# Patient Record
Sex: MALE | Race: WHITE | URBAN - METROPOLITAN AREA
[De-identification: names, ages, dates, MRNs, and addresses within clinical notes are randomized per-mention and may not be internally consistent; named-entity substitution may affect disease eponyms.]

---

## 2017-08-15 ENCOUNTER — OFFICE VISIT (OUTPATIENT)
Dept: ORTHOPEDIC SURGERY | Age: 47
End: 2017-08-15

## 2017-08-15 VITALS — WEIGHT: 205 LBS | HEIGHT: 75 IN | BODY MASS INDEX: 25.49 KG/M2

## 2017-08-15 DIAGNOSIS — S61.239A: ICD-10-CM

## 2017-08-15 DIAGNOSIS — S62.624S OPEN DISPLACED FRACTURE OF MIDDLE PHALANX OF RIGHT RING FINGER, SEQUELA: Primary | ICD-10-CM

## 2017-08-15 PROBLEM — S62.622B OPEN DISPLACED FRACTURE OF MIDDLE PHALANX OF RIGHT MIDDLE FINGER: Status: ACTIVE | Noted: 2017-08-15

## 2017-08-15 PROCEDURE — 99203 OFFICE O/P NEW LOW 30 MIN: CPT | Performed by: ORTHOPAEDIC SURGERY

## 2017-08-22 ENCOUNTER — OFFICE VISIT (OUTPATIENT)
Dept: ORTHOPEDIC SURGERY | Age: 47
End: 2017-08-22

## 2017-08-22 VITALS — WEIGHT: 205.03 LBS | BODY MASS INDEX: 25.49 KG/M2 | HEIGHT: 75 IN

## 2017-08-22 DIAGNOSIS — S62.624B OPEN DISPLACED FRACTURE OF MIDDLE PHALANX OF RIGHT RING FINGER, INITIAL ENCOUNTER: ICD-10-CM

## 2017-08-22 DIAGNOSIS — S61.239D GUNSHOT WOUND OF FINGER OF RIGHT HAND, SUBSEQUENT ENCOUNTER: Primary | ICD-10-CM

## 2017-08-22 PROCEDURE — 99213 OFFICE O/P EST LOW 20 MIN: CPT | Performed by: ORTHOPAEDIC SURGERY

## 2017-09-08 ENCOUNTER — OFFICE VISIT (OUTPATIENT)
Dept: ORTHOPEDIC SURGERY | Age: 47
End: 2017-09-08

## 2017-09-08 VITALS — BODY MASS INDEX: 25.49 KG/M2 | HEIGHT: 75 IN | WEIGHT: 205 LBS

## 2017-09-08 DIAGNOSIS — S62.622D OPEN DISPLACED FRACTURE OF MIDDLE PHALANX OF RIGHT MIDDLE FINGER WITH ROUTINE HEALING, SUBSEQUENT ENCOUNTER: ICD-10-CM

## 2017-09-08 DIAGNOSIS — S61.239D GUNSHOT WOUND OF FINGER OF RIGHT HAND, SUBSEQUENT ENCOUNTER: Primary | ICD-10-CM

## 2017-09-08 PROCEDURE — 99213 OFFICE O/P EST LOW 20 MIN: CPT | Performed by: ORTHOPAEDIC SURGERY

## 2017-09-22 ENCOUNTER — HOSPITAL ENCOUNTER (OUTPATIENT)
Dept: OCCUPATIONAL THERAPY | Age: 47
Discharge: HOME | End: 2017-09-30
Admitting: ORTHOPAEDIC SURGERY
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
## 2017-09-26 PROBLEM — S62.624B OPEN DISPLACED FRACTURE OF MIDDLE PHALANX OF RIGHT RING FINGER: Status: ACTIVE | Noted: 2017-09-26

## 2017-09-28 ENCOUNTER — TELEPHONE (OUTPATIENT)
Dept: ORTHOPEDIC SURGERY | Age: 47
End: 2017-09-28
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
## 2017-10-02 NOTE — TELEPHONE ENCOUNTER
Eugenio Baxter was out of the office all last week and today is my first day back. [employer] has been adjusted to the best of my ability.  Please let me know if there needs to be anything further corrected

## 2017-10-02 NOTE — TELEPHONE ENCOUNTER
Katelyn GALVAN replied she was out of the office and either Kedar Lester or Colby Miller assisted Dr. Latonya Car on this Friday visit. I am reaching out to both of them and Karla (PSS w/Dr. Latonya Car) as I need this to be corrected asap the IW is not getting paid.     Venkat Beltráners  Signed  Date of Service: 2017 4:08 PM         Received call from  Trang Tabor w/[employer] from 17  17 and she noted that the medco is confusing is the IW released to full duty, working with restrictions, what is the actual status?     SECTION 3A             YES is checked that patient has restrictions                                              TEMPORARY is checked as well     SECTION 3B             NO is checked as IW is not able to return to duties held prior to injury                                              ESTIMATED return to [id number] [place] [address] date is 17     SECTION 3C            Return to work with restrictions date is 17                                              Activity boxes are all check C for continuously                                              Free type area is ok     SECTION 4A             YES or NO has not been checked next to the conditions with which on is responsible for working with restrictions                                                 Not available at the time of visit but there has been update to allowed conditions:  THIS MUST BE ON THE REVISED [address] [place] [address],[address] REQUESTED                                              D09.217T [address] OPEN WOUND W/O NAIL BED DAMAGE     Once the [employer] has been revised and I've been notified I will forward to ab

## 2017-10-06 ENCOUNTER — OFFICE VISIT (OUTPATIENT)
Dept: ORTHOPEDIC SURGERY | Age: 47
End: 2017-10-06

## 2017-10-06 ENCOUNTER — HOSPITAL ENCOUNTER (OUTPATIENT)
Dept: OCCUPATIONAL THERAPY | Age: 47
Discharge: HOME | End: 2017-10-06
Admitting: ORTHOPAEDIC SURGERY

## 2017-10-06 VITALS — WEIGHT: 205.03 LBS | BODY MASS INDEX: 25.49 KG/M2 | HEIGHT: 75 IN

## 2017-10-06 DIAGNOSIS — S62.624D: ICD-10-CM

## 2017-10-06 DIAGNOSIS — S61.239D GUNSHOT WOUND OF FINGER OF RIGHT HAND, SUBSEQUENT ENCOUNTER: ICD-10-CM

## 2017-10-06 DIAGNOSIS — M79.644 FINGER PAIN, RIGHT: Primary | ICD-10-CM

## 2017-10-06 PROCEDURE — 73140 X-RAY EXAM OF FINGER(S): CPT | Performed by: ORTHOPAEDIC SURGERY

## 2017-10-06 PROCEDURE — 99213 OFFICE O/P EST LOW 20 MIN: CPT | Performed by: ORTHOPAEDIC SURGERY

## 2017-10-06 NOTE — MR AVS SNAPSHOT
not need to use this code after youve completed the sign-up process. If you do not sign up before the expiration date, you must request a new code. [employer] Access Code: [email or web address]  Expires: 10/8/2017 11:00 PM    4. Enter your Social Security Number ([id number]) and Date of Birth (mm/dd/yyyy) as indicated and click Submit. You will be taken to the next sign-up page. 5. Create a [employer] ID. This will be your [employer] login ID and cannot be changed, so think of one that is secure and easy to remember. 6. Create a [employer] password. You can change your password at any time. 7. Enter your Password Reset Question and Answer. This can be used at a later time if you forget your password. 8. Enter your e-mail address. You will receive e-mail notification when new information is available in [address]. 9. Click Sign Up. You can now view your medical record. Additional Information  If you have questions, please contact the physician practice where you receive care. Remember, [employer] is NOT to be used for urgent needs. For medical emergencies, dial 911. For questions regarding your [employer] account call [phone]. If you have a clinical question, please call your doctor's office.
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
## 2017-10-06 NOTE — FLOWSHEET NOTE
% or less on the Quick DASH disability questionnaire for increased performance with carrying, moving, and handling objects. 3) Pt will demonstrate increased ROM to right ring finger SALEH to 220 for improved get ring finger out of the way to write efficiently. Helene Conteh 4) Pt will demonstrate increased strength to right  increase to 100 lbs. for improved ability to pull object out of a manhole with a rope. Progression Towards Functional goals:  [x] Patient is progressing as expected towards functional goals listed. [] Progression is slowed due to complexities listed. [] Progression has been slowed due to co-morbidities. [] Plan just implemented, too soon to assess goals progression  [] Other:     ASSESSMENT:    Treatment/Activity Tolerance:  [x] Patient tolerated treatment well [] Patient limited by fatique  [] Patient limited by pain  [] Patient limited by other medical complications  [] Other:     Prognosis: [x] Good [] Fair  [] Poor    Patient Requires Follow-up: [x] Yes  [] No    PLAN: Recommend Occupational Therapy continue, decrease to 1x time a week for 4 weeks  [x] Continue per plan of care [] Alter current plan (see comments)  [] Plan of care initiated [] Hold pending MD visit [] Discharge    Plan for next session: progress ROM and strengthening, scar mob, and consider extension splint if progress slows.    Thermal modalities, functional activities and strengthening as inicated, AROM, PROM as indicated    Electronically signed by: KEREN Yen/AKIN, [address] [place], [place] [id number]

## 2017-10-13 ENCOUNTER — HOSPITAL ENCOUNTER (OUTPATIENT)
Dept: OCCUPATIONAL THERAPY | Age: 47
Discharge: HOME | End: 2017-10-13
Admitting: ORTHOPAEDIC SURGERY

## 2017-10-13 NOTE — FLOWSHEET NOTE
8.2  L: 7.4    Edema in cm circumf. Wrist R:  L:     strength in lbs R: 80  L: 115 R:85  L:105   Pinch Strengthin lbs: lat  R: 21  L: 24    Pinch Strength in lbs:  3 point R: 15  L: 20          Modalities: 9/22/17   10/6/17  10/13/17     Hot Pack With alternating passive digital ext and flex 10 min Para + HP             Therapeutic Exercise & Activities:        PROM PIP extension  Intrinsic minus flexion composite, PIP ext      AROM Intrinsic minus flexion and composite fisting, isolated digital extension off table       education To above exercises, use of heat, and use of compression for edema control       Power   #4 30x        Re-eval, review HEP      putty   Rolling/kneading/pinching     digiflex   green       xxlg compression sleeve XLG     Sponge  with tongs   X     Elastic putty   pulls       Therapeutic Exercise and NMR EXR  [x] (53759) Provided verbal/tactile cueing for activities related to strengthening, flexibility, endurance, ROM  for improvements in scapular, scapulothoracic and UE control with self care, reaching, carrying, lifting, house/yardwork, driving/computer work.    [] (00798) Provided verbal/tactile cueing for activities related to improving balance, coordination, kinesthetic sense, posture, motor skill, proprioception  to assist with  scapular, scapulothoracic and UE control with self care, reaching, carrying, lifting, house/yardwork, driving/computer work. Therapeutic Activities:    [] ( 47469) therapeutic activities, direct (one on one) patient contact. Use of dynamic activities to improve functional performance.     Activities of Daily Living:  [] (72295) Provided self-care/home management training (i.e., activities of daily living and compensatory training, meal preparation, safety procedures, and instructions in use of assistive technology devices/adaptive equipment)     Home Exercise Program:    [x] (51981) Reviewed/Progressed HEP activities related to strengthening, on the Quick DASH disability questionnaire for increased performance with carrying, moving, and handling objects. 3) Pt will demonstrate increased ROM to right ring finger SALEH to 220 for improved get ring finger out of the way to write efficiently. Santi Khan 4) Pt will demonstrate increased strength to right  increase to 100 lbs. for improved ability to pull object out of a manhole with a rope. Progression Towards Functional goals:  [x] Patient is progressing as expected towards functional goals listed. [] Progression is slowed due to complexities listed. [] Progression has been slowed due to co-morbidities. [] Plan just implemented, too soon to assess goals progression  [] Other:     ASSESSMENT:    Treatment/Activity Tolerance:  [x] Patient tolerated treatment well [] Patient limited by fatique  [] Patient limited by pain  [] Patient limited by other medical complications  [] Other:     Prognosis: [x] Good [] Fair  [] Poor    Patient Requires Follow-up: [x] Yes  [] No    PLAN: Recommend Occupational Therapy continue, decrease to 1x time a week for 4 weeks  [x] Continue per plan of care [] Alter current plan (see comments)  [] Plan of care initiated [] Hold pending MD visit [] Discharge    Plan for next session: progress ROM and strengthening, scar mob, and consider extension splint if progress slows.    Thermal modalities, functional activities and strengthening as inicated, AROM, PROM as indicated    Electronically signed by: Odessa Telles OT/L [id number]

## 2017-10-20 ENCOUNTER — HOSPITAL ENCOUNTER (OUTPATIENT)
Dept: OCCUPATIONAL THERAPY | Age: 47
Discharge: HOME | End: 2017-10-20
Admitting: ORTHOPAEDIC SURGERY
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
## 2017-10-27 ENCOUNTER — HOSPITAL ENCOUNTER (OUTPATIENT)
Dept: OCCUPATIONAL THERAPY | Age: 47
Discharge: HOME | End: 2017-10-27
Admitting: ORTHOPAEDIC SURGERY

## 2017-10-27 NOTE — FLOWSHEET NOTE
Edema in cm circumf. MCPJs R: 8.2  L: 7.4     Edema in cm circumf. Wrist R:  L:      strength in lbs R: 80  L: 115 R:85  L:105 R: 120  L: 120   Pinch Strengthin lbs: lat  R: 21  L: 24     Pinch Strength in lbs:  3 point R: 15  L: 20           Modalities: 9/22/17   10/6/17  10/13/17 10/20/17  10/27/17   Hot Pack With alternating passive digital ext and flex 10 min Para + HP same same        US 50% at 1.4 at scar areas   Therapeutic Exercise & Activities:        PROM PIP extension  Intrinsic minus flexion composite, PIP ext  Intrinsic stretch added as he was having trouble adducting the small finger    AROM Intrinsic minus flexion and composite fisting, isolated digital extension off table       education To above exercises, use of heat, and use of compression for edema control       Power   #4 30x  #4      Re-eval, review HEP      putty   Rolling/kneading/pinching Blue-pull, mallet,     digiflex   green   blue                xxlg compression sleeve XLG     Sponge  with tongs   X     Power  with sponge      X   digi flex     Green and blue x 25   Body blade     Hold with finger tips   Flex bar    blue    Elastic putty   pulls  pulls     Therapeutic Exercise and NMR EXR  [x] (39083) Provided verbal/tactile cueing for activities related to strengthening, flexibility, endurance, ROM  for improvements in scapular, scapulothoracic and UE control with self care, reaching, carrying, lifting, house/yardwork, driving/computer work.    [] (44196) Provided verbal/tactile cueing for activities related to improving balance, coordination, kinesthetic sense, posture, motor skill, proprioception  to assist with  scapular, scapulothoracic and UE control with self care, reaching, carrying, lifting, house/yardwork, driving/computer work. Therapeutic Activities:    [] ( 71261) therapeutic activities, direct (one on one) patient contact.  Use of dynamic activities to improve functional 2. Patient will have a decrease in pain to facilitate improvement in movement, function, and ADLs as indicated by Functional Deficits.-goal met     Long Term Goals to be achieved in 6  weeks, including patient directed goals to address identified performance deficits:  1) Pt to be independent in graded HEP progression with a good level of effort and compliance. 2) Pt to report a score of  20 % or less on the Quick DASH disability questionnaire for increased performance with carrying, moving, and handling objects. 3) Pt will demonstrate increased ROM to right ring finger SALEH to 220 for improved get ring finger out of the way to write efficiently. Vernida Chard 4) Pt will demonstrate increased strength to right  increase to 100 lbs. for improved ability to pull object out of a manhole with a rope. Progression Towards Functional goals:  [x] Patient is progressing as expected towards functional goals listed. [] Progression is slowed due to complexities listed. [] Progression has been slowed due to co-morbidities. [] Plan just implemented, too soon to assess goals progression  [] Other:     ASSESSMENT:  Better strength, still complains of stiffness, edema remains  Treatment/Activity Tolerance:  [x] Patient tolerated treatment well [] Patient limited by fatique  [] Patient limited by pain  [] Patient limited by other medical complications  [] Other:     Prognosis: [x] Good [] Fair  [] Poor    Patient Requires Follow-up: [x] Yes  [] No    PLAN: Recommend Occupational Therapy continue, decrease to 1x time a week for 4 weeks  [x] Continue per plan of care [] Alter current plan (see comments)  [] Plan of care initiated [] Hold pending MD visit [] Discharge    Plan for next session: progress ROM and strengthening, scar mob, and consider extension splint if progress slows.    Thermal modalities, functional activities and strengthening as inicated, AROM, PROM as indicated    Electronically signed by: Rocky Houston OT/L

## 2017-11-01 ENCOUNTER — HOSPITAL ENCOUNTER (OUTPATIENT)
Dept: PHYSICAL THERAPY | Age: 47
Discharge: HOME | End: 2017-11-30
Attending: ORTHOPAEDIC SURGERY | Admitting: ORTHOPAEDIC SURGERY

## 2017-11-03 ENCOUNTER — HOSPITAL ENCOUNTER (OUTPATIENT)
Dept: OCCUPATIONAL THERAPY | Age: 47
Discharge: HOME | End: 2017-11-03
Admitting: ORTHOPAEDIC SURGERY

## 2017-11-03 ENCOUNTER — OFFICE VISIT (OUTPATIENT)
Dept: ORTHOPEDIC SURGERY | Age: 47
End: 2017-11-03

## 2017-11-03 VITALS — WEIGHT: 205.03 LBS | BODY MASS INDEX: 25.49 KG/M2 | HEIGHT: 75 IN

## 2017-11-03 DIAGNOSIS — S62.622S: Primary | ICD-10-CM

## 2017-11-03 DIAGNOSIS — S61.202S: ICD-10-CM

## 2017-11-03 PROBLEM — S61.202A OPEN WOUND OF RIGHT MIDDLE FINGER WITHOUT DAMAGE TO NAIL: Status: ACTIVE | Noted: 2017-08-15

## 2017-11-03 PROBLEM — S62.622A CLOSED DISPLACED FRACTURE OF MIDDLE PHALANX OF RIGHT MIDDLE FINGER: Status: ACTIVE | Noted: 2017-11-03

## 2017-11-03 PROCEDURE — 99213 OFFICE O/P EST LOW 20 MIN: CPT | Performed by: ORTHOPAEDIC SURGERY
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
## 2017-11-03 NOTE — OP NOTE
circumf. Wrist R:  L:        strength in lbs R: 80  L: 115 R:85  L:105 R: 90  L:100   Pinch Strengthin lbs: lat  R: 21  L: 24       Pinch Strength in lbs:  3 point R: 15  L: 20          GOALS   Short Term Goals: To be achieved in: 2 weeks  1. Independent in HEP and progression per patient tolerance, in order to prevent re-injury.-goal met   2. Patient will have a decrease in pain to facilitate improvement in movement, function, and ADLs as indicated by Functional Deficits.-goal met     Long Term Goals to be achieved in 6  weeks, including patient directed goals to address identified performance deficits:  1) Pt to be independent in graded HEP progression with a good level of effort and compliance. -met  2) Pt to report a score of  20 % or less on the Quick DASH disability questionnaire for increased performance with carrying, moving, and handling objects.-met  3) Pt will demonstrate increased ROM to right ring finger SALEH to 220 for improved get ring finger out of the way to write efficiently. 4) Pt will demonstrate increased strength to right  increase to 100 lbs. for improved ability to pull object out of a manhole with a rope. -not met    ASSESSMENT:  Better strength, still complains of stiffness, edema remains- strength goal not met    PLAN:           Recommend continuing Occupational Therapy, 1x/week for 4 weeks with focus on ROM and functional strengtheng  [x] Continue per plan of care

## 2017-11-03 NOTE — FLOWSHEET NOTE
Ring MP: 75  PIP: 18/73  DIP 0/53 0/90  8/85  0/65  0/85  8/85  0/70                              Small MP:  PIP:  DIP      Edema in cm circumf. MCPJs R: 8.2  L: 7.4      Edema in cm circumf.   Wrist R:  L:       strength in lbs R: 80  L: 115 R:85  L:105 R: 120  L: 120 R: 90  L:100   Pinch Strengthin lbs: lat  R: 21  L: 24      Pinch Strength in lbs:  3 point R: 15  L: 20            Modalities: 9/22/17   10/6/17  10/13/17 10/20/17  10/27/17 11/3/17    Hot Pack With alternating passive digital ext and flex 10 min Para + HP same same same        US 50% at 1.4 at scar areas    Therapeutic Exercise & Activities:         PROM PIP extension  Intrinsic minus flexion composite, PIP ext  Intrinsic stretch added as he was having trouble adducting the small finger  Prom digit, scar massage   AROM Intrinsic minus flexion and composite fisting, isolated digital extension off table     Re-eval   education To above exercises, use of heat, and use of compression for edema control        Power   #4 30x  #4       Re-eval, review HEP       putty   Rolling/kneading/pinching Blue-pull, mallet,      digiflex   green   blue                 xxlg compression sleeve XLG      Sponge  with tongs   X      Power  with sponge      X    digi flex     Green and blue x 25    Body blade     Hold with finger tips    Flex bar    blue     Elastic putty   pulls  pulls      Therapeutic Exercise and NMR EXR  [x] (77393) Provided verbal/tactile cueing for activities related to strengthening, flexibility, endurance, ROM  for improvements in scapular, scapulothoracic and UE control with self care, reaching, carrying, lifting, house/yardwork, driving/computer work.    [] (02090) Provided verbal/tactile cueing for activities related to improving balance, coordination, kinesthetic sense, posture, motor skill, proprioception  to assist with  scapular, scapulothoracic and UE control with self care, reaching,

## 2017-12-01 ENCOUNTER — HOSPITAL ENCOUNTER (OUTPATIENT)
Dept: PHYSICAL THERAPY | Age: 47
Discharge: HOME | End: 2017-12-31
Attending: ORTHOPAEDIC SURGERY | Admitting: ORTHOPAEDIC SURGERY

## 2019-05-06 ENCOUNTER — OFFICE VISIT (OUTPATIENT)
Dept: ORTHOPEDIC SURGERY | Age: 49
End: 2019-05-06
Payer: COMMERCIAL

## 2019-05-06 VITALS — WEIGHT: 223 LBS | HEIGHT: 75 IN | BODY MASS INDEX: 27.73 KG/M2

## 2019-05-06 DIAGNOSIS — G56.03 BILATERAL CARPAL TUNNEL SYNDROME: ICD-10-CM

## 2019-05-06 DIAGNOSIS — M75.81 TENDONITIS OF BOTH ROTATOR CUFFS: ICD-10-CM

## 2019-05-06 DIAGNOSIS — M75.82 TENDONITIS OF BOTH ROTATOR CUFFS: ICD-10-CM

## 2019-05-06 DIAGNOSIS — R52 PAIN: Primary | ICD-10-CM

## 2019-05-06 PROCEDURE — 99214 OFFICE O/P EST MOD 30 MIN: CPT | Performed by: ORTHOPAEDIC SURGERY

## 2019-05-06 RX ORDER — DICLOFENAC SODIUM 75 MG/1
75 TABLET, DELAYED RELEASE ORAL 2 TIMES DAILY WITH MEALS
Qty: 60 TABLET | Refills: 1 | Status: SHIPPED | OUTPATIENT
Start: 2019-05-06
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
## 2019-05-07 ENCOUNTER — HOSPITAL ENCOUNTER (OUTPATIENT)
Dept: PHYSICAL THERAPY | Age: 49
Setting detail: THERAPIES SERIES
Discharge: HOME | End: 2019-05-07
Payer: COMMERCIAL

## 2019-05-07 PROCEDURE — 97161 PT EVAL LOW COMPLEX 20 MIN: CPT

## 2019-05-07 PROCEDURE — 97110 THERAPEUTIC EXERCISES: CPT

## 2019-05-07 PROCEDURE — G0283 ELEC STIM OTHER THAN WOUND: HCPCS

## 2019-05-07 PROCEDURE — 97112 NEUROMUSCULAR REEDUCATION: CPT

## 2019-05-07 NOTE — FLOWSHEET NOTE
[facility], [address] [place]  Phone: [phone]  Fax [phone]      Physical Therapy Daily Treatment Note  Date:  2019    Patient Name:  Jose D Saeed    :  1970  MRN: [id number]  Restrictions/Precautions:    Medical/Treatment Diagnosis Information:  · Diagnosis: M75.81, M75.82 Bilateral Shouldeer RTC Tendinitis  · Treatment Diagnosis: M25.511, M25.512 Bilateral SHLD Pain;  M25.611, M25.612 Bilateral SHLD Stiffness,  M25.311, M25.312 Bilateral Scapular Instability, M62.81 Bilateral Scapular and RTC Weakness  Insurance/Certification information:  PT Insurance Information: Gr[employer] (60 PT, 80/20%, Needs Auth)  Physician Information:  Referring Practitioner: Dr. Alexandro Delaney of care signed (Y/N): due 2019    Date of Patient follow up with Physician:     G-Code (if applicable):      Date G-Code Applied:  Quick Dash: 32% 2019       Progress Note: [x]  Yes  []  No  Next due by: Visit #10      Latex Allergy:  [x]NO      []YES  Preferred Language for Healthcare:   [x]English       []other:    Visit # Insurance Allowable Requires auth   1 Check Orthonet  60  2xwk for 6 wks    []no        [x]yes: Orthonet     Pain level:  5 /10  Left SHLD,  5/10 Right SHLD     SUBJECTIVE:  See eval    OBJECTIVE: See eval  Observation:   Test measurements:      RESTRICTIONS/PRECAUTIONS: Emg has been ordered to rule out carpal tunnel syndrom both hands.     Exercises/Interventions:   Therapeutic Ex Sets/rep comments   Scap Squeeze H10x10 hep   No Money RTB H2 2x10 hep   Seated Scap Retrac/Depression BTB H2 2x10 hep   Doorway Pec Minor Stretch H15x3 Bilat hep                                                                              Manual Intervention     Begin NV                                   NMR re-education     DX Review, SHLD Biomechanics Education, SHLD Care Techniques using SHLD model and anatomy illustrations 15 min hep        Posture Correction @ wall and in sitting with CX DG H5 x10 each  W/ mirror hep                                Therapeutic Exercise and NMR EXR  [] (08785) Provided verbal/tactile cueing for activities related to strengthening, flexibility, endurance, ROM  for improvements in scapular, scapulothoracic and UE control with self care, reaching, carrying, lifting, house/yardwork, driving/computer work.    [] (40761) Provided verbal/tactile cueing for activities related to improving balance, coordination, kinesthetic sense, posture, motor skill, proprioception  to assist with  scapular, scapulothoracic and UE control with self care, reaching, carrying, lifting, house/yardwork, driving/computer work. Therapeutic Activities:    [x] (03432 or 28729) Provided verbal/tactile cueing for activities related to improving balance, coordination, kinesthetic sense, posture, motor skill, proprioception and motor activation to allow for proper function of scapular, scapulothoracic and UE control with self care, carrying, lifting, driving/computer work.      Home Exercise Program:    [x] (24662) Reviewed/Progressed HEP activities related to strengthening, flexibility, endurance, ROM of scapular, scapulothoracic and UE control with self care, reaching, carrying, lifting, house/yardwork, driving/computer work  [] (30381) Reviewed/Progressed HEP activities related to improving balance, coordination, kinesthetic sense, posture, motor skill, proprioception of scapular, scapulothoracic and UE control with self care, reaching, carrying, lifting, house/yardwork, driving/computer work      Manual Treatments:  PROM / STM / Oscillations-Mobs:  G-I, II, III, IV (PA's, Inf., Post.)  [] (76416) Provided manual therapy to mobilize soft tissue/joints of cervical/CT, scapular GHJ and UE for the purpose of modulating pain, promoting relaxation,  increasing ROM, reducing/eliminating soft tissue swelling/inflammation/restriction, improving soft tissue extensibility and allowing for proper ROM for normal function with self care, reaching, carrying, lifting, house/yardwork, driving/computer work    Modalities:  PM ES/CP both shoulders (supraspinatus)x15 min    Charges:  Timed Code Treatment Minutes: 35   Total Treatment Minutes: 70     [x] EVAL (LOW) 33273 (typically 20 minutes face-to-face)  [] EVAL (MOD) 34415 (typically 30 minutes face-to-face)  [] EVAL (HIGH) 79763 (typically 45 minutes face-to-face)  [] RE-EVAL     [x] DV(50785) x      [] IONTO  [x] NMR (61357) x      [] VASO  [] Manual (26994) x       [] Other:  [] TA x       [] Mech Traction (81898)  [] ES(attended) (00243)      [x] ES (un) (11201):     GOALS:  Short Term Goals: To be achieved in: 2 weeks  1. Independent in HEP and progression per patient tolerance, in order to prevent re-injury. 2. Patient will have a decrease in pain to facilitate improvement in movement, function, and ADLs as indicated by Functional Deficits.     Long Term Goals: To be achieved in: 6 weeks  1. Disability index score of 18% or less for the [facility] to assist with reaching prior level of function. 2. Patient will demonstrate increased AROM bilat shoulder flex to 170 degrees and Int rotation to T9 to allow for proper joint functioning as indicated by patients Functional Deficits. 3. Patient will demonstrate an increase in Strength to 5/5 to allow for proper functional mobility as indicated by patients Functional Deficits. 4. Patient will be able to sleep on his sides w/o increase in symptoms or restrictions. 5.Return to UE weight lifting w/o increase in symptoms or restrictions. Progression Towards Functional goals:  [] Patient is progressing as expected towards functional goals listed. [] Progression is slowed due to complexities listed. [] Progression has been slowed due to co-morbidities.   [x] Plan just implemented, too soon to assess goals progression  [] Other: ASSESSMENT:  See eval    Treatment/Activity Tolerance:  [x] Patient tolerated treatment well [] Patient limited by fatique  [] Patient limited by pain  [] Patient limited by other medical complications  [] Other:     Prognosis: [x] Good [] Fair  [] Poor    Patient Requires Follow-up: [x] Yes  [] No    PLAN: See eval  [] Continue per plan of care [] Alter current plan (see comments)  [x] Plan of care initiated [] Hold pending MD visit [] Discharge    Electronically signed by: Arnoldo Rios, [address]

## 2019-05-07 NOTE — PLAN OF CARE
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 [] Falls Risk assessed and Patient requires intervention due to being higher risk   TUG score (>12s at risk):     [] Falls education provided, including       G-Codes:   Quick Dash = 32%    ASSESSMENT:   Functional Impairments   [x]Noted spinal or UE joint hypomobility   []Noted spinal or UE joint hypermobility   [x]Decreased UE functional ROM   [x]Decreased UE functional strength   []Abnormal reflexes/sensation/myotomal/dermatomal deficits   [x]Decreased RC/scapular/core strength and neuromuscular control   []other:      Functional Activity Limitations (from functional questionnaire and intake)   [x]Reduced ability to tolerate prolonged functional positions   [x]Reduced ability or difficulty with changes of positions or transfers between positions   [x]Reduced ability to maintain good posture and demonstrate good body mechanics with sitting, bending, and lifting   [x] Reduced ability or tolerance with driving and/or computer work   [x]Reduced ability to sleep   [x]Reduced ability to perform lifting, reaching, carrying tasks   [x]Reduced ability to tolerate impact through UE   [x]Reduced ability to reach behind back   []Reduced ability to  or hold objects   [x]Reduced ability to throw or toss an object   []other:    Participation Restrictions   []Reduced participation in self care activities   [x]Reduced participation in home management activities   [x]Reduced participation in work activities   []Reduced participation in social activities. [x]Reduced participation in sport/recreation activities. Classification:   []Signs/symptoms consistent with post-surgical status including decreased ROM, strength and function.   []Signs/symptoms consistent with joint sprain/strain   [x]Signs/symptoms consistent with shoulder impingement   [x]Signs/symptoms consistent with shoulder/elbow/wrist tendinopathy   []Signs/symptoms consistent with Rotator cuff tear   []Signs/symptoms consistent with labral tear   []Signs/symptoms consistent with postural dysfunction    []Signs/symptoms consistent with Glenohumeral IR Deficit - <45 degrees   []Signs/symptoms consistent with facet dysfunction of cervical/thoracic spine    []Signs/symptoms consistent with pathology which may benefit from Dry needling     []other:     Prognosis/Rehab Potential:      []Excellent   [x]Good    []Fair   []Poor    Tolerance of evaluation/treatment:    []Excellent   [x]Good    []Fair   []Poor  Physical Therapy Evaluation Complexity Justification  [x] A history of present problem with:  [] no personal factors and/or comorbidities that impact the plan of care;  [x]1-2 personal factors and/or comorbidities that impact the plan of care  []3 personal factors and/or comorbidities that impact the plan of care  [x] An examination of body systems using standardized tests and measures addressing any of the following: body structures and functions (impairments), activity limitations, and/or participation restrictions;:  [x] a total of 1-2 or more elements   [] a total of 3 or more elements   [] a total of 4 or more elements   [x] A clinical presentation with:  [x] stable and/or uncomplicated characteristics   [] evolving clinical presentation with changing characteristics  [] unstable and unpredictable characteristics;   [x] Clinical decision making of [x] low, [] moderate, [] high complexity using standardized patient assessment instrument and/or measurable assessment of functional outcome.     [x] EVAL (LOW) 60873 (typically 20 minutes face-to-face)  [] EVAL (MOD) 27976 (typically 30 minutes face-to-face)  [] EVAL (HIGH) 84476 (typically 45 minutes face-to-face)  [] RE-EVAL       PLAN:  Frequency/Duration:  2 days per week for 6 Weeks:  INTERVENTIONS:  [x] Therapeutic exercise including: strength training, ROM, for Upper extremity and core   [x]  NMR activation and proprioception for UE, scap and Core   [x] Manual therapy as indicated for shoulder, scapula and spine to include: Dry Needling/IASTM, STM, PROM, Gr I-IV mobilizations, manipulation. [x] Modalities as needed that may include: thermal agents, E-stim, Biofeedback, US, iontophoresis as indicated  [x] Patient education on joint protection, postural re-education, activity modification, progression of HEP. HEP instruction: Pt given written HEP and TB.(see scanned forms)    GOALS:  Patient stated goal: Return to UE weight lifting w/o increase in symptoms or restrictions. Therapist goals for Patient:   Short Term Goals: To be achieved in: 2 weeks  1. Independent in HEP and progression per patient tolerance, in order to prevent re-injury. 2. Patient will have a decrease in pain to facilitate improvement in movement, function, and ADLs as indicated by Functional Deficits. Long Term Goals: To be achieved in: 6 weeks  1. Disability index score of 18% or less for the [facility] to assist with reaching prior level of function. 2. Patient will demonstrate increased AROM bilat shoulder flex to 170 degrees and Int rotation to T9 to allow for proper joint functioning as indicated by patients Functional Deficits. 3. Patient will demonstrate an increase in Strength to 5/5 to allow for proper functional mobility as indicated by patients Functional Deficits. 4. Patient will be able to sleep on his sides w/o increase in symptoms or restrictions. 5.Return to UE weight lifting w/o increase in symptoms or restrictions.           Electronically signed by:  Yessi Garcai, [address]

## 2019-05-09 ENCOUNTER — HOSPITAL ENCOUNTER (OUTPATIENT)
Dept: PHYSICAL THERAPY | Age: 49
Setting detail: THERAPIES SERIES
Discharge: HOME | End: 2019-05-09
Payer: COMMERCIAL

## 2019-05-09 PROCEDURE — 97110 THERAPEUTIC EXERCISES: CPT | Performed by: PHYSICAL THERAPIST

## 2019-05-09 PROCEDURE — G0283 ELEC STIM OTHER THAN WOUND: HCPCS | Performed by: PHYSICAL THERAPIST

## 2019-05-09 PROCEDURE — 97140 MANUAL THERAPY 1/> REGIONS: CPT | Performed by: PHYSICAL THERAPIST

## 2019-05-09 NOTE — FLOWSHEET NOTE
[facility], [address] [place]  Phone: [phone]  Fax [phone]      Physical Therapy Daily Treatment Note  Date:  2019    Patient Name:  Carlie Granger    :  1970  MRN: [id number]  Restrictions/Precautions:    Medical/Treatment Diagnosis Information:  · Diagnosis: M75.81, M75.82 Bilateral Shouldeer RTC Tendinitis  · Treatment Diagnosis: M25.511, M25.512 Bilateral SHLD Pain;  M25.611, M25.612 Bilateral SHLD Stiffness,  M25.311, M25.312 Bilateral Scapular Instability, M62.81 Bilateral Scapular and RTC Weakness  Insurance/Certification information:  PT Insurance Information: Gr[employer]iela (60 PT, 80/20%, Needs Auth)  Physician Information:  Referring Practitioner: Dr. Thomas Sensor of care signed (Y/N): due 2019    Date of Patient follow up with Physician:     G-Code (if applicable):      Date G-Code Applied:  Quick Dash: 32% 2019       Progress Note: []  Yes  [x]  No  Next due by: Visit #10      Latex Allergy:  [x]NO      []YES  Preferred Language for Healthcare:   [x]English       []other:    Visit # Insurance Allowable Requires auth   2 Check Orthonet  60  2xwk for 6 wks    []no        [x]yes: Orthonet     Pain level:   0-4/10  Left SHLD,  0-4/10 Right SHLD     SUBJECTIVE:  Pt reports his shoulders are feeling better. He is trying to sleep on his back. He has been compliant with the HEP and paying attn to posture     OBJECTIVE:   Observation:   Test measurements:      RESTRICTIONS/PRECAUTIONS: Emg has been ordered to rule out carpal tunnel syndrom both hands.     Exercises/Interventions:   Therapeutic Ex Sets/rep comments   Scap Squeeze H10x10 hep   No Money RTB H2 2x10 hep   Seated Scap Retrac/Depression BTB H2 2x10 hep   Doorway Pec Minor Stretch H15x3 Bilat hep   Arm bike  4 min warm up     [place]ley flex  2x10     See ATC sheet   Started 19    TB IR/ER  BTB 2x10 ea  + hep 19    Supine SP  5# 3x10     Prone MT row  2# 2x10     Prone lats  4# 2x10                                             Manual Intervention     B shoulder PROM with [address] mobs  15 min                                   NMR re-education     DX Review, SHLD Biomechanics Education, SHLD Care Techniques using SHLD model and anatomy illustrations 15 min hep        Posture Correction @ wall and in sitting with CX DG H5 x10 each  W/ mirror hep                                Therapeutic Exercise and NMR EXR  [] (06849) Provided verbal/tactile cueing for activities related to strengthening, flexibility, endurance, ROM  for improvements in scapular, scapulothoracic and UE control with self care, reaching, carrying, lifting, house/yardwork, driving/computer work.    [] (12893) Provided verbal/tactile cueing for activities related to improving balance, coordination, kinesthetic sense, posture, motor skill, proprioception  to assist with  scapular, scapulothoracic and UE control with self care, reaching, carrying, lifting, house/yardwork, driving/computer work. Therapeutic Activities:    [x] (25922 or 99491) Provided verbal/tactile cueing for activities related to improving balance, coordination, kinesthetic sense, posture, motor skill, proprioception and motor activation to allow for proper function of scapular, scapulothoracic and UE control with self care, carrying, lifting, driving/computer work.      Home Exercise Program:    [x] (38964) Reviewed/Progressed HEP activities related to strengthening, flexibility, endurance, ROM of scapular, scapulothoracic and UE control with self care, reaching, carrying, lifting, house/yardwork, driving/computer work  [] (09364) Reviewed/Progressed HEP activities related to improving balance, coordination, kinesthetic sense, posture, motor skill, proprioception of scapular, scapulothoracic and UE control with self care, reaching, carrying, lifting, house/yardwork, driving/computer work      Manual Treatments: PROM / STM / Oscillations-Mobs:  G-I, II, III, IV (PA's, Inf., Post.)  [] (60539) Provided manual therapy to mobilize soft tissue/joints of cervical/CT, scapular GHJ and UE for the purpose of modulating pain, promoting relaxation,  increasing ROM, reducing/eliminating soft tissue swelling/inflammation/restriction, improving soft tissue extensibility and allowing for proper ROM for normal function with self care, reaching, carrying, lifting, house/yardwork, driving/computer work    Modalities:  PM ES/CP both shoulders (supraspinatus)x15 min    Charges:  Timed Code Treatment Minutes: 40   Total Treatment Minutes: 55     [] EVAL (LOW) 41562 (typically 20 minutes face-to-face)  [] EVAL (MOD) 62949 (typically 30 minutes face-to-face)  [] EVAL (HIGH) 36419 (typically 45 minutes face-to-face)  [] RE-EVAL     [x] EC(58765) x  2   [] IONTO  [] NMR (54085) x      [] VASO  [x] Manual (47784) x  1    [] Other:  [] TA x       [] Mech Traction (52641)  [] ES(attended) (44695)      [x] ES (un) (76894):     GOALS:  Short Term Goals: To be achieved in: 2 weeks  1. Independent in HEP and progression per patient tolerance, in order to prevent re-injury. 2. Patient will have a decrease in pain to facilitate improvement in movement, function, and ADLs as indicated by Functional Deficits.     Long Term Goals: To be achieved in: 6 weeks  1. Disability index score of 18% or less for the [facility] to assist with reaching prior level of function. 2. Patient will demonstrate increased AROM bilat shoulder flex to 170 degrees and Int rotation to T9 to allow for proper joint functioning as indicated by patients Functional Deficits. 3. Patient will demonstrate an increase in Strength to 5/5 to allow for proper functional mobility as indicated by patients Functional Deficits. 4. Patient will be able to sleep on his sides w/o increase in symptoms or restrictions.           5.Return to UE weight lifting w/o increase in symptoms or

## 2019-05-13 ENCOUNTER — HOSPITAL ENCOUNTER (OUTPATIENT)
Dept: PHYSICAL THERAPY | Age: 49
Setting detail: THERAPIES SERIES
Discharge: HOME | End: 2019-05-13
Payer: COMMERCIAL

## 2019-05-13 PROCEDURE — 97110 THERAPEUTIC EXERCISES: CPT

## 2019-05-13 PROCEDURE — G0283 ELEC STIM OTHER THAN WOUND: HCPCS

## 2019-05-13 PROCEDURE — 97140 MANUAL THERAPY 1/> REGIONS: CPT

## 2019-05-13 NOTE — FLOWSHEET NOTE
[facility] [facility], [address] [place]  Phone: [phone]  Fax [phone]      Physical Therapy Daily Treatment Note  Date:  2019    Patient Name:  Cesar Arriola    :  1970  MRN: [id number]  Restrictions/Precautions:    Medical/Treatment Diagnosis Information:  · Diagnosis: M75.81, M75.82 Bilateral Shouldeer RTC Tendinitis  · Treatment Diagnosis: M25.511, M25.512 Bilateral SHLD Pain;  M25.611, M25.612 Bilateral SHLD Stiffness,  M25.311, M25.312 Bilateral Scapular Instability, M62.81 Bilateral Scapular and RTC Weakness  Insurance/Certification information:  PT Insurance Information: Gr[employer]iel[employer] (60 PT, 80/20%, Needs Auth)  Physician Information:  Referring Practitioner: Dr. Elisa Hernandez of care signed (Y/N): due 2019    Date of Patient follow up with Physician:     G-Code (if applicable):      Date G-Code Applied:  Quick Dash: 32% 2019       Progress Note: []  Yes  [x]  No  Next due by: Visit #10      Latex Allergy:  [x]NO      []YES  Preferred Language for Healthcare:   [x]English       []other:    Visit # Insurance Allowable Requires auth   3 Check Orthonet  60  2xwk for 6 wks    []no        [x]yes: Orthonet     Pain level:   0-2/10  Left SHLD,  0-2/10 Right SHLD   SUBJECTIVE: Both shoulders feeling better w/ meds and therapy. Able to work out in the field today with minimal discomfort mostly @ end ranges of motion. Fatigued after therapy; didn't realize how weak he had gotten. OBJECTIVE:   Observation: Very tight posterior capsula and thoracic spine. Added Thread-the Needle and Sleeper Stretches to HEP. Pt given new handout after good technique demonstrated (scanned). Test measurements: Increase bilat AROM into shoulder flex to 165 deg bilat. RESTRICTIONS/PRECAUTIONS: Emg has been ordered to rule out carpal tunnel syndrom both hands.     Exercises/Interventions:   Therapeutic Ex Sets/rep comments   No Money RTB H2 2x10 hep   Seated Scap Retrac/Depression BTB H2 2x10 hep   Doorway Pec Minor Stretch H15x3 Bilat hep   Arm bike  5 min warm up     Pulley flex  2x10     SB Corner Press-ups 2x10    Pulley IR H5 x10 B    All 4's Scap Matrix OTB 3x5  2 sets bilat    90/90 Wall Stretch H15x3B    TB IR/ER  BTB 2x10 ea  + hep 5/9/19    Supine SP  5# 3x10     SB Prone  row  3# 2x10     SB Prone T 1# 2x10    SB Prone lats  4# 2x10     SL ER 3# 2x10 B    Thread the Needle H5x5 B +hep 5-   Sleeper stretch H15x3 +hep 5-             ATC See Note Start 5-9-19             Manual Intervention     B shoulder PROM with GH mobs  15 min                    NMR re-education                                  Therapeutic Exercise and NMR EXR  [] [phone]) Provided verbal/tactile cueing for activities related to strengthening, flexibility, endurance, ROM  for improvements in scapular, scapulothoracic and UE control with self care, reaching, carrying, lifting, house/yardwork, driving/computer work.    [] (29675) Provided verbal/tactile cueing for activities related to improving balance, coordination, kinesthetic sense, posture, motor skill, proprioception  to assist with  scapular, scapulothoracic and UE control with self care, reaching, carrying, lifting, house/yardwork, driving/computer work. Therapeutic Activities:    [x] (04497 or 22776) Provided verbal/tactile cueing for activities related to improving balance, coordination, kinesthetic sense, posture, motor skill, proprioception and motor activation to allow for proper function of scapular, scapulothoracic and UE control with self care, carrying, lifting, driving/computer work.      Home Exercise Program:    [x] (98699) Reviewed/Progressed HEP activities related to strengthening, flexibility, endurance, ROM of scapular, scapulothoracic and UE control with self care, reaching, carrying, lifting, house/yardwork, driving/computer work  [] (62346) Reviewed/Progressed HEP activities related to improving balance, coordination, kinesthetic sense, posture, motor skill, proprioception of scapular, scapulothoracic and UE control with self care, reaching, carrying, lifting, house/yardwork, driving/computer work      Manual Treatments:  PROM / STM / Oscillations-Mobs:  G-I, II, III, IV (PA's, Inf., Post.)  [] (06508) Provided manual therapy to mobilize soft tissue/joints of cervical/CT, scapular GHJ and UE for the purpose of modulating pain, promoting relaxation,  increasing ROM, reducing/eliminating soft tissue swelling/inflammation/restriction, improving soft tissue extensibility and allowing for proper ROM for normal function with self care, reaching, carrying, lifting, house/yardwork, driving/computer work    Modalities:  PM ES/CP both shoulders (supraspinatus)x15 min    Charges:  Timed Code Treatment Minutes: 45   Total Treatment Minutes: 60     [] EVAL (LOW) 60970 (typically 20 minutes face-to-face)  [] EVAL (MOD) 31833 (typically 30 minutes face-to-face)  [] EVAL (HIGH) 29482 (typically 45 minutes face-to-face)  [] RE-EVAL     [x] WH(94760) x  2   [] IONTO  [] NMR (28314) x      [] VASO  [x] Manual (72709) x  1    [] Other:  [] TA x       [] Mech Traction (36981)  [] ES(attended) (50760)      [x] ES (un) (81028):     GOALS:  Short Term Goals: To be achieved in: 2 weeks  1. Independent in HEP and progression per patient tolerance, in order to prevent re-injury. 2. Patient will have a decrease in pain to facilitate improvement in movement, function, and ADLs as indicated by Functional Deficits.     Long Term Goals: To be achieved in: 6 weeks  1. Disability index score of 18% or less for the [facility] to assist with reaching prior level of function. 2. Patient will demonstrate increased AROM bilat shoulder flex to 170 degrees and Int rotation to T9 to allow for proper joint functioning as indicated by patients Functional Deficits.    3. Patient will demonstrate an increase in Strength to 5/5 to allow for proper functional mobility as indicated by patients Functional Deficits. 4. Patient will be able to sleep on his sides w/o increase in symptoms or restrictions. 5.Return to UE weight lifting w/o increase in symptoms or restrictions. Progression Towards Functional goals:  [x] Patient is progressing as expected towards functional goals listed. [] Progression is slowed due to complexities listed. [] Progression has been slowed due to co-morbidities. [] Plan just implemented, too soon to assess goals progression  [] Other:     ASSESSMENT: Steady progress all areas.      Treatment/Activity Tolerance:  [x] Patient tolerated treatment well [] Patient limited by fatique  [] Patient limited by pain  [] Patient limited by other medical complications  [] Other:     Prognosis: [x] Good [] Fair  [] Poor    Patient Requires Follow-up: [x] Yes  [] No    PLAN:   [x] Continue per plan of care [] Alter current plan (see comments)  [] Plan of care initiated [] Hold pending MD visit [] Discharge    Electronically signed by: Kamryn Field, [address]

## 2019-05-13 NOTE — FLOWSHEET NOTE
[facility] [address] [place]  Phone: [phone]  Fax [phone]    ATHLETIC TRAINING [address]  Date:  2019    Patient Name:  Britton Cavazos    :  1970  MRN: [id number]  Restrictions/Precautions:    Medical/Treatment Diagnosis Information:  ·  B shld RC tendonitis  ·  B shld pain, stiffness, scap instability/weakness, RC weakness  Physician Information:   Dr. Garrido Tonny  2wks    4 wks 6 wks 8 wks   3wks 6 wks 9 wks 12 wks                        Activity Log                                                          DOS/DOI:                                                         Date: 19   ATC Commun: , underground lifting, pushing, pulling. Gym workout, kayak, canoe, hunt Discussed gym workout, IH workout made L>R weakness     OVL Pullbacks x10   Plyoback      Chop                          Chest                          ER Flip          Long/Short lever  Flex. OVL flex lift 15x OVL flex lift x15                                Scap.                                  ABd.           punch          Body/Cardio Blade Flex/Ext                                     IR/ER                                     ABd/ADd          Push-up      Plus                            Wall                          Table                         Floor          Ball on Wall                 Tramp          Theraband    Rows/Ext                           IR                           ER                           No money                           Horiz.  ABd                           Biceps                           Triceps                           Punches          Cable Column   Rows 35# (B) 3x10 IH 40# 3x10                              Ext. 35# (B) 3x10 IH 30# 3x10                              Lat. Pulldown 45# (B) 3x10 IH 35# 3x10                              Biceps Triceps 35# (B) 3x10 IH 30# 3x10        Modality ES/CP 15' B shlds PM/CP 15' B shlds   Initials JLW DTM   Time spent one on one (workers comp)     Time spent with PT assistant

## 2019-05-21 ENCOUNTER — HOSPITAL ENCOUNTER (OUTPATIENT)
Dept: PHYSICAL THERAPY | Age: 49
Setting detail: THERAPIES SERIES
Discharge: HOME | End: 2019-05-21
Payer: COMMERCIAL

## 2019-05-21 PROCEDURE — G0283 ELEC STIM OTHER THAN WOUND: HCPCS

## 2019-05-21 PROCEDURE — 97112 NEUROMUSCULAR REEDUCATION: CPT

## 2019-05-21 PROCEDURE — 97110 THERAPEUTIC EXERCISES: CPT

## 2019-05-21 PROCEDURE — 97140 MANUAL THERAPY 1/> REGIONS: CPT

## 2019-05-21 NOTE — FLOWSHEET NOTE
RESTRICTIONS/PRECAUTIONS: Emg has been ordered to rule out carpal tunnel syndrom both hands. Exercises/Interventions:   Therapeutic Ex Sets/rep comments   Arm bike  5 min warm up     Pulley flex  2x10     SB Corner Press-ups 2x10    Pulley IR H5 x10 B    All 4's Scap Matrix OTB 3x5  2 sets bilat    90/90 Wall Stretch H15x3B    Standing ER @ 90 BTB 3x10    Standing IR @ 90 BTB 3x10    Trustretch SHLD Flex Stretch w/ purple band H15x3 B    Plank 4\" step walk up/downs x10    Supine SP  5# 3x10     SB Prone  row  3# 3x10     SB Prone Ext rot 2# 2x10    SB Prone flex 0# 2x10     Thread the Needle H5x5 B +hep 5-   Sleeper stretch H15x3 +hep 5-   SL Modified post capsule stretch H15x3    Standing PNF D2 YTB 2x10    ATC See Note Start 5-9-19             Manual Intervention     B shoulder PROM with GH mobs  16 min                    NMR re-education     Prone PNF UE man resist D2 3x5 B                                 Therapeutic Exercise and NMR EXR  [x] (29839) Provided verbal/tactile cueing for activities related to strengthening, flexibility, endurance, ROM  for improvements in scapular, scapulothoracic and UE control with self care, reaching, carrying, lifting, house/yardwork, driving/computer work.    [] (64295) Provided verbal/tactile cueing for activities related to improving balance, coordination, kinesthetic sense, posture, motor skill, proprioception  to assist with  scapular, scapulothoracic and UE control with self care, reaching, carrying, lifting, house/yardwork, driving/computer work. Therapeutic Activities:    [x] (00316 or 34513) Provided verbal/tactile cueing for activities related to improving balance, coordination, kinesthetic sense, posture, motor skill, proprioception and motor activation to allow for proper function of scapular, scapulothoracic and UE control with self care, carrying, lifting, driving/computer work.      Home Exercise Program:    [x] (90530) Reviewed/Progressed HEP activities related to strengthening, flexibility, endurance, ROM of scapular, scapulothoracic and UE control with self care, reaching, carrying, lifting, house/yardwork, driving/computer work  [] (94880) Reviewed/Progressed HEP activities related to improving balance, coordination, kinesthetic sense, posture, motor skill, proprioception of scapular, scapulothoracic and UE control with self care, reaching, carrying, lifting, house/yardwork, driving/computer work      Manual Treatments:  PROM / STM / Oscillations-Mobs:  G-I, II, III, IV (PA's, Inf., Post.)  [x] (79593) Provided manual therapy to mobilize soft tissue/joints of cervical/CT, scapular GHJ and UE for the purpose of modulating pain, promoting relaxation,  increasing ROM, reducing/eliminating soft tissue swelling/inflammation/restriction, improving soft tissue extensibility and allowing for proper ROM for normal function with self care, reaching, carrying, lifting, house/yardwork, driving/computer work    Modalities:  PM ES/CP both shoulders (supraspinatus)x15 min    Charges:  Timed Code Treatment Minutes: 60   Total Treatment Minutes: 75     [] EVAL (LOW) 47076 (typically 20 minutes face-to-face)  [] EVAL (MOD) 45921 (typically 30 minutes face-to-face)  [] EVAL (HIGH) 24032 (typically 45 minutes face-to-face)  [] RE-EVAL     [x] EG(74848) x  2   [] IONTO  [x] NMR (78611) x  1   [] VASO  [x] Manual (09794) x  1    [] Other:  [] TA x       [] Mech Traction (04396)  [] ES(attended) (90837)      [x] ES (un) (89734):     GOALS:  Short Term Goals: To be achieved in: 2 weeks  1. Independent in HEP and progression per patient tolerance, in order to prevent re-injury. Met  2. Patient will have a decrease in pain to facilitate improvement in movement, function, and ADLs as indicated by Functional Deficits. Met     Long Term Goals: To be achieved in: 6 weeks  1.  Disability index score of 18% or less for the [facility] to assist with reaching prior level of

## 2019-05-24 ENCOUNTER — HOSPITAL ENCOUNTER (OUTPATIENT)
Dept: PHYSICAL THERAPY | Age: 49
Setting detail: THERAPIES SERIES
Discharge: HOME | End: 2019-05-24
Payer: COMMERCIAL

## 2019-05-24 NOTE — BRIEF OP NOTE
[facility], [address]        Physical Therapy  Cancellation/No-show Note  Patient Name:  Les Awan  :  1970   Date:  2019  Cancelled visits to date: 1  No-shows to date: 0    For today's appointment patient:  X? Cancelled  ? Rescheduled appointment  ? No-show     Reason given by patient:  ?  Patient ill  ? Conflicting appointment  ? No transportation    ? Conflict with work  X? No reason given  ?   Other:     Comments:      Electronically signed by:  Arnoldo Rios, PT details…

## 2019-06-06 ENCOUNTER — OFFICE VISIT (OUTPATIENT)
Dept: ORTHOPEDIC SURGERY | Age: 49
End: 2019-06-06
Payer: COMMERCIAL

## 2019-06-06 VITALS — WEIGHT: 223.11 LBS | BODY MASS INDEX: 27.74 KG/M2 | HEIGHT: 75 IN

## 2019-06-06 DIAGNOSIS — G56.03 BILATERAL CARPAL TUNNEL SYNDROME: Primary | ICD-10-CM

## 2019-06-06 PROCEDURE — 95886 MUSC TEST DONE W/N TEST COMP: CPT | Performed by: PHYSICAL MEDICINE & REHABILITATION

## 2019-06-06 PROCEDURE — 95911 NRV CNDJ TEST 9-10 STUDIES: CPT | Performed by: PHYSICAL MEDICINE & REHABILITATION

## 2019-06-06 PROCEDURE — 99999 PR OFFICE/OUTPT VISIT,PROCEDURE ONLY: CPT | Performed by: PHYSICAL MEDICINE & REHABILITATION

## 2019-06-06 PROCEDURE — 99213 OFFICE O/P EST LOW 20 MIN: CPT | Performed by: ORTHOPAEDIC SURGERY

## 2019-06-06 NOTE — PROGRESS NOTES
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 Focused is really the wrist.  He just is EMG this morning the findings are as noted below. He continues to ongoing numbness and tingling in a median nerve distribution bilaterally and ongoing night pain as well. He is not no any particular weakness. The patient is a 54 y.o. male   No past medical history on file. Work Status: [employer]    The pain assessment was noted & is as follows:  Pain Assessment  Location of Pain: Wrist  Location Modifiers: Right, Left  Severity of Pain: 4  Quality of Pain: Dull, Aching  Duration of Pain: A few hours  Frequency of Pain: Several times daily  Aggravating Factors: Bending, Stretching  Limiting Behavior: Some  Relieving Factors: Rest  Result of Injury: No  Work-Related Injury: No  Are there other pain locations you wish to document?: No]      Work Status/Functionality:     Past Medical History: Medical history form was reviewed today & can be found in the media tab  No past medical history on file. Past Surgical History:     Past Surgical History:   Procedure Laterality Date    FRACTURE SURGERY      TONSILLECTOMY       Current Medications:     Current Outpatient Medications:     diclofenac (VOLTAREN) 75 MG EC tablet, Take 1 tablet by mouth 2 times daily (with meals), Disp: 60 tablet, Rfl: 1  Allergies:  Codeine  Social History:    reports that he has quit smoking. He has never used smokeless tobacco. He reports that he does not drink alcohol or use drugs. Family History:   No family history on file. REVIEW OF SYSTEMS:   For new problems, a full review of systems will be found scanned in the patient's chart.   CONSTITUTIONAL: Denies unexplained weight loss, fevers, chills   NEUROLOGICAL: Denies unsteady gait or progressive weakness  SKIN: Denies skin changes, delayed healing, rash, itching       PHYSICAL EXAM:    Vitals: Height 6' 3\" (1.905 m), weight 223 lb 1.7 oz (101.2 kg). GENERAL EXAM:  · General Apparence: Patient is adequately groomed with no evidence of malnutrition. · Orientation: The patient is oriented to time, place and person. · Mood & Affect:The patient's mood and affect are appropriate       Bilateral hands PHYSICAL EXAMINATION:  · Inspection:  No visible asymmetry deformity or swelling. · Palpation:  No focal tenderness. · Range of Motion: Normal wrist and finger range of motion. · Strength: Normal strength including the thenar eminence with no atrophy and no weakness.-Bilateral    · Special Tests:  Negative Tinel's decubital tunnel bilateral.            · Skin:  There are no rashes, ulcerations or lesions. · There are no distal dysvascular changes     Gait & station:       Additional Examinations:              Diagnostic Testing: The following x rays were read and interpreted by myself      I reviewed the EMG that was done this morning by Dr. Sarkis Robins. The patient does indeed have moderate bilateral carpal tunnel syndrome as expected. Some mild LEFT ulnar neuropathy at the LEFT elbow. Orders   No orders of the defined types were placed in this encounter. Assessment / Treatment Plan:     1. Carpal tunnel syndrome. He understands that this is a surgical lesion. We started by 2 weeks. He can do the most symptomatically 1 first.  He is going to look at his schedule & papers today. We'll schedule by phone with Ana Golden for the exact date. 2. We discussed the risks, benefits and potential complications of surgery. The patient realizes that there are concerns with respect to infection, deep vein thrombosis, neurologic and or vascular injury as well as delayed rehabilitation.   The patient also realizes that there potential anesthetic complications including cardiopulmonary issues, medication reactions and even death.  The patient is aware of these risks and desires to proceed with surgery. 3.  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 I have personally performed and/or participated in the history, exam and medical decision making and agree with all pertinent clinical information. I have also reviewed and agree with the past medical, family and social history unless otherwise noted. This dictation was performed with a verbal recognition program (DRAGON) and it was checked for errors. It is possible that there are still dictated errors within this office note. If so, please bring any errors to my attention for an addendum. All efforts were made to ensure that this office note is accurate.           Laird Landau, MD

## 2019-06-06 NOTE — PROGRESS NOTES
[address] MEDICINE      Patient: jC Radford  Sex: Male  YOB: 1970  Age: 54 Years 1 Months  Date: 6/6/2019  Ref. Phys.: Dr Fady Gruber  Notes:  r/o bilateral CTS      Sensory NCS      Nerve / Sites Peak PeakAmp Dist Junaid    ms µV cm m/s   L MEDIAN - D2 ULNAR D5   1. Median Wrist 4.30 3.6 14 40.0   2. Ulnar Wrist 3.50 16.6 14 50.9   R MEDIAN - D2 ULNAR D5   1. Median Wrist 4.05 5.5 14 43.1   2. Ulnar Wrist 3.55 26.9 14 51.9   L MEDIAN - RADIAL THUMB   1. Median Wrist       2. Radial Wrist 2.25 18.2 10 58.8   R MEDIAN - RADIAL THUMB   1. Median Wrist       2. Radial Wrist 2.25 20.4 10 55.6       Motor NCS      Nerve / Sites Lat Amp Amp Dist Junaid    ms mV % cm m/s   L MEDIAN - APB   1. Wrist 4.65 10.4 100 8    2. Elbow 9.55 9.7 93.3 26 53.1   R MEDIAN - APB   1. Wrist 4.95 6.8 100 8    2. Elbow 9.80 6.8 100 26 53.6   L ULNAR - ADM   1. Wrist 3.15 10.2 100 8    2. B. Elbow 7.15 9.5 93.5 23 57.5   3. A. Elbow 9.45 9.6 93.6 10 43.5   R ULNAR - ADM   1. Wrist 3.30 10.5 100 8    2. B. Elbow 6.95 10.4 99.6 24 65.8   3. A. Elbow 8.70 10.6 101 10 57. 1       EMG      EMG Summary Table     Spontaneous MUAP Recruit. Ins. Act Fibs. PSW Fasics. H.F. Amp. Dur. Poly's. Pattern   L. FIRST D INTEROSS N None None None None N N N N   R. ABD POLL BREVIS N None None None None N N N N   L. BICEPS N None None None None N N N N   R. BICEPS N None None None None N N N N   L. TRICEPS N None None None None N N N N   R. TRICEPS N None None None None N N N N   L. EXT CARPI R BREV N None None None None N N N N   R. EXT CARPI R BREV N None None None None N N N N   L. EXT DIG COMM N None None None None N N N N   R. EXT DIG COMM N None None None None N N N N   L. PRON TERES N None None None None N N N N   R. PRON TERES N None None None None N N N N       Summary: Bilateral median SNAP and CMAP latencies are slowed across the wrists with spared amplitudes.   Left ulnar CMAP conduction velocity across the wrist is slowed. The remainder of the nerve conduction studies and monopolar exam are normal, as recorded above. Impression: Abnormal examination. There is electrodiagnostic evidence of:    1. Moderate bilateral median mononeuropathy around the wrist (carpal tunnel syndrome)  2. Mild left ulnar mononeuropathy at the elbow  3.  No evidence of any other left or right upper extremity mononeuropathy, plexopathy, or radiculopathy            Gem Villalobos MD